# Patient Record
Sex: FEMALE | Race: WHITE | URBAN - METROPOLITAN AREA
[De-identification: names, ages, dates, MRNs, and addresses within clinical notes are randomized per-mention and may not be internally consistent; named-entity substitution may affect disease eponyms.]

---

## 2024-05-15 ENCOUNTER — APPOINTMENT (OUTPATIENT)
Dept: URBAN - METROPOLITAN AREA CLINIC 210 | Age: 84
Setting detail: DERMATOLOGY
End: 2024-05-15

## 2024-05-15 PROBLEM — C44.591 OTHER SPECIFIED MALIGNANT NEOPLASM OF SKIN OF BREAST: Status: ACTIVE | Noted: 2024-05-15

## 2024-05-15 PROCEDURE — OTHER REFERRAL: OTHER

## 2024-05-15 PROCEDURE — OTHER CONSULTATION FOR MOHS SURGERY: OTHER

## 2024-05-15 PROCEDURE — OTHER SMOKING CESSATION COUNSELING: OTHER

## 2024-05-15 PROCEDURE — 99204 OFFICE O/P NEW MOD 45 MIN: CPT

## 2024-05-15 PROCEDURE — OTHER MIPS QUALITY: OTHER

## 2024-05-15 NOTE — HPI: MOHS SURGERY CONSULTATION
previous_biopsy_has_been_previously_biopsied
Accession (Optional): [id number]
Who Is Your Referring Provider?: Ras Bardales MD
When Was Your Biopsy?: 3/27/2024

## 2024-05-15 NOTE — PROCEDURE: MIPS QUALITY
Quality 47: Advance Care Plan: Advance Care Planning discussed and documented; advance care plan or surrogate decision maker documented in the medical record.
Detail Level: Detailed
Quality 226: Preventive Care And Screening: Tobacco Use: Screening And Cessation Intervention: Patient screened for tobacco use, is a smoker AND received Cessation Counseling within measurement period or in the six months prior to the measurement period
Quality 130: Documentation Of Current Medications In The Medical Record: Current Medications Documented
Quality 431: Preventive Care And Screening: Unhealthy Alcohol Use - Screening: Patient not identified as an unhealthy alcohol user when screened for unhealthy alcohol use using a systematic screening method

## 2024-05-15 NOTE — PROCEDURE: CONSULTATION FOR MOHS SURGERY
Yes
Detail Level: Detailed
Size Of Lesion: 2.4
X Size Of Lesion In Cm (Optional): 2.3
Name Of The Referring Provider For Procedure: Ras Hewitt MD
Other Plan: Mohs surgery with outside repair vs. complete treatment by plastics
Incorporate Mauc In Note: Yes